# Patient Record
Sex: FEMALE | Race: ASIAN
[De-identification: names, ages, dates, MRNs, and addresses within clinical notes are randomized per-mention and may not be internally consistent; named-entity substitution may affect disease eponyms.]

---

## 2017-11-17 ENCOUNTER — HOSPITAL ENCOUNTER (OUTPATIENT)
Dept: HOSPITAL 10 - HKI | Age: 77
Discharge: HOME | End: 2017-11-17
Payer: MEDICARE

## 2017-11-17 DIAGNOSIS — M17.12: Primary | ICD-10-CM

## 2017-11-17 DIAGNOSIS — M25.562: ICD-10-CM

## 2017-11-17 NOTE — HKNOTE
DATE OF SERVICE:  11/17/2017

 

 

CHIEF COMPLAINT:  Left knee pain.

 

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
emity pain.  The patient reports that the pain is located in her left knee and thigh for the past se
veral weeks.  There is no history of trauma.  There is no history of fever or chills.  The patient s
tates that the pain is worse with weightbearing activities and with certain movements of her left lo
wer extremity.  She also reports some groin pain on the left side.  The left knee pain does not radi
ate to the foot or ankle.  The patient has tried anti-inflammatory medications without relief.  She 
is concerned because she is traveling out of the country next week and wants to make sure that her k
nee is okay.

 

PAST MEDICAL HISTORY:  Significant for diabetes, hypertension, chronic back and shoulder pain, heari
ng loss.

 

MEDICATIONS:  Include:  

1. Carvedilol. 

2. Janumet. 

3. Amlodipine.

4. Centrum.

5. Valsartan

6. Crestor.

7. Actonel.

 

ALLERGIES:  NONE.

 

REVIEW OF SYSTEMS:  Negative for chest pain, shortness of breath, nausea, vomiting, diarrhea.  Posit
jonas for left lower extremity pain and stiffness.

 

PHYSICAL EXAMINATION:

GENERAL:  Shows a pleasant female.  She is anxious, awake, alert and oriented.  She walks without a 
limp.

VITAL SIGNS:  Stable with a pulse rate of 68, respirations 16.  She is 4 feet 11 inches.

MUSCULOSKELETAL:  Examination of the left lower extremity shows no swelling of the left knee.  There
 is no obvious deformity.  There is mild medial joint line tenderness.  Patient has full range of mo
tion of her left knee.  There is no neurologic deficit.  The left hip has limited internal and exter
nal rotation with groin pain.  Lumbar spine has mild tenderness as well.

 

IMAGING:  X-rays of the left knee were reviewed and show mild medial compartment narrowing and mild 
medial compartment osteoarthritis.

 

ASSESSMENT AND PLAN:  A 77-year-old female with left knee pain that seems to be associated with left
 thigh and left hip pain.  The patient is still able to do her usual activities including [employer] exer
cises and walking.  She is quite concerned because the pain has not decreased in the last 2 to 3 mon
ths.  At this point, a cortisone injection in the left knee will be administered.  The patient is fr
ee to continue her activities and travel.  She will follow up in 3 months.  I would like to get an x
-ray of the left hip at that time.  Since it appears that there may be left hip osteoarthritis.  The
re is no indication for surgical treatment at this time.

 

 

Dictated By: WINDY COSTELLO MD

 

UB/MARTA

DD:    11/17/2017 15:37:37

DT:    11/17/2017 19:17:37

Conf#: [id number]

DID#:  [id number]

## 2018-02-16 ENCOUNTER — HOSPITAL ENCOUNTER (OUTPATIENT)
Dept: HOSPITAL 91 - HKI | Age: 78
Discharge: HOME | End: 2018-02-16
Payer: COMMERCIAL

## 2018-02-16 ENCOUNTER — HOSPITAL ENCOUNTER (OUTPATIENT)
Age: 78
Discharge: HOME | End: 2018-02-16

## 2018-02-16 DIAGNOSIS — M70.62: ICD-10-CM

## 2018-02-16 DIAGNOSIS — M25.552: Primary | ICD-10-CM

## 2018-02-16 DIAGNOSIS — M79.652: ICD-10-CM

## 2018-02-16 PROCEDURE — 73502 X-RAY EXAM HIP UNI 2-3 VIEWS: CPT
